# Patient Record
Sex: FEMALE | Race: WHITE | NOT HISPANIC OR LATINO | Employment: UNEMPLOYED | URBAN - METROPOLITAN AREA
[De-identification: names, ages, dates, MRNs, and addresses within clinical notes are randomized per-mention and may not be internally consistent; named-entity substitution may affect disease eponyms.]

---

## 2019-07-18 ENCOUNTER — AMBULATORY - HEALTHEAST (OUTPATIENT)
Dept: ADDICTION MEDICINE | Facility: CLINIC | Age: 44
End: 2019-07-18

## 2019-07-18 ENCOUNTER — OFFICE VISIT - HEALTHEAST (OUTPATIENT)
Dept: ADDICTION MEDICINE | Facility: CLINIC | Age: 44
End: 2019-07-18

## 2019-07-18 DIAGNOSIS — F15.20 METHAMPHETAMINE USE DISORDER, SEVERE (H): ICD-10-CM

## 2019-07-22 ENCOUNTER — OFFICE VISIT - HEALTHEAST (OUTPATIENT)
Dept: ADDICTION MEDICINE | Facility: CLINIC | Age: 44
End: 2019-07-22

## 2019-07-22 ENCOUNTER — AMBULATORY - HEALTHEAST (OUTPATIENT)
Dept: ADDICTION MEDICINE | Facility: CLINIC | Age: 44
End: 2019-07-22

## 2019-07-22 ENCOUNTER — DOCUMENTATION ONLY (OUTPATIENT)
Dept: OTHER | Facility: CLINIC | Age: 44
End: 2019-07-22

## 2019-07-22 ENCOUNTER — AMBULATORY - HEALTHEAST (OUTPATIENT)
Dept: OTHER | Facility: CLINIC | Age: 44
End: 2019-07-22

## 2019-07-22 DIAGNOSIS — F15.20 METHAMPHETAMINE USE DISORDER, SEVERE (H): ICD-10-CM

## 2019-07-24 ENCOUNTER — OFFICE VISIT - HEALTHEAST (OUTPATIENT)
Dept: ADDICTION MEDICINE | Facility: CLINIC | Age: 44
End: 2019-07-24

## 2019-07-24 DIAGNOSIS — F15.20 METHAMPHETAMINE USE DISORDER, SEVERE (H): ICD-10-CM

## 2019-07-26 ENCOUNTER — OFFICE VISIT - HEALTHEAST (OUTPATIENT)
Dept: ADDICTION MEDICINE | Facility: CLINIC | Age: 44
End: 2019-07-26

## 2019-07-26 DIAGNOSIS — F15.20 METHAMPHETAMINE USE DISORDER, SEVERE (H): ICD-10-CM

## 2019-07-29 ENCOUNTER — AMBULATORY - HEALTHEAST (OUTPATIENT)
Dept: ADDICTION MEDICINE | Facility: CLINIC | Age: 44
End: 2019-07-29

## 2019-07-31 ENCOUNTER — OFFICE VISIT - HEALTHEAST (OUTPATIENT)
Dept: ADDICTION MEDICINE | Facility: CLINIC | Age: 44
End: 2019-07-31

## 2019-07-31 DIAGNOSIS — F15.20 METHAMPHETAMINE USE DISORDER, SEVERE (H): ICD-10-CM

## 2019-08-02 ENCOUNTER — AMBULATORY - HEALTHEAST (OUTPATIENT)
Dept: ADDICTION MEDICINE | Facility: CLINIC | Age: 44
End: 2019-08-02

## 2019-08-02 ENCOUNTER — OFFICE VISIT - HEALTHEAST (OUTPATIENT)
Dept: ADDICTION MEDICINE | Facility: CLINIC | Age: 44
End: 2019-08-02

## 2019-08-02 DIAGNOSIS — F15.20 METHAMPHETAMINE USE DISORDER, SEVERE (H): ICD-10-CM

## 2019-08-05 ENCOUNTER — COMMUNICATION - HEALTHEAST (OUTPATIENT)
Dept: ADDICTION MEDICINE | Facility: CLINIC | Age: 44
End: 2019-08-05

## 2019-08-05 ENCOUNTER — OFFICE VISIT - HEALTHEAST (OUTPATIENT)
Dept: ADDICTION MEDICINE | Facility: CLINIC | Age: 44
End: 2019-08-05

## 2019-08-05 DIAGNOSIS — F15.20 METHAMPHETAMINE USE DISORDER, SEVERE (H): ICD-10-CM

## 2019-08-07 ENCOUNTER — OFFICE VISIT - HEALTHEAST (OUTPATIENT)
Dept: ADDICTION MEDICINE | Facility: CLINIC | Age: 44
End: 2019-08-07

## 2019-08-07 DIAGNOSIS — F15.20 METHAMPHETAMINE USE DISORDER, SEVERE (H): ICD-10-CM

## 2019-08-09 ENCOUNTER — OFFICE VISIT - HEALTHEAST (OUTPATIENT)
Dept: ADDICTION MEDICINE | Facility: CLINIC | Age: 44
End: 2019-08-09

## 2019-08-09 DIAGNOSIS — F15.20 METHAMPHETAMINE USE DISORDER, SEVERE (H): ICD-10-CM

## 2019-08-10 ENCOUNTER — AMBULATORY - HEALTHEAST (OUTPATIENT)
Dept: ADDICTION MEDICINE | Facility: CLINIC | Age: 44
End: 2019-08-10

## 2019-08-12 ENCOUNTER — OFFICE VISIT - HEALTHEAST (OUTPATIENT)
Dept: ADDICTION MEDICINE | Facility: CLINIC | Age: 44
End: 2019-08-12

## 2019-08-12 DIAGNOSIS — F15.20 METHAMPHETAMINE USE DISORDER, SEVERE (H): ICD-10-CM

## 2019-08-14 ENCOUNTER — OFFICE VISIT - HEALTHEAST (OUTPATIENT)
Dept: ADDICTION MEDICINE | Facility: CLINIC | Age: 44
End: 2019-08-14

## 2019-08-14 DIAGNOSIS — F15.20 METHAMPHETAMINE USE DISORDER, SEVERE (H): ICD-10-CM

## 2019-08-17 ENCOUNTER — AMBULATORY - HEALTHEAST (OUTPATIENT)
Dept: ADDICTION MEDICINE | Facility: CLINIC | Age: 44
End: 2019-08-17

## 2019-08-23 ENCOUNTER — AMBULATORY - HEALTHEAST (OUTPATIENT)
Dept: ADDICTION MEDICINE | Facility: CLINIC | Age: 44
End: 2019-08-23

## 2019-08-23 ENCOUNTER — OFFICE VISIT - HEALTHEAST (OUTPATIENT)
Dept: ADDICTION MEDICINE | Facility: CLINIC | Age: 44
End: 2019-08-23

## 2019-08-23 DIAGNOSIS — F15.20 METHAMPHETAMINE USE DISORDER, SEVERE (H): ICD-10-CM

## 2019-08-26 ENCOUNTER — OFFICE VISIT - HEALTHEAST (OUTPATIENT)
Dept: ADDICTION MEDICINE | Facility: CLINIC | Age: 44
End: 2019-08-26

## 2019-08-26 ENCOUNTER — AMBULATORY - HEALTHEAST (OUTPATIENT)
Dept: LAB | Facility: CLINIC | Age: 44
End: 2019-08-26

## 2019-08-26 DIAGNOSIS — F15.20 METHAMPHETAMINE USE DISORDER, SEVERE (H): ICD-10-CM

## 2019-08-26 LAB
AMPHETAMINES UR QL SCN: NORMAL
BARBITURATES UR QL: NORMAL
BENZODIAZ UR QL: NORMAL
CANNABINOIDS UR QL SCN: NORMAL
COCAINE UR QL: NORMAL
CREAT UR-MCNC: 12.1 MG/DL
ETHANOL UR CFM-MCNC: <10 MG/DL
METHADONE UR QL SCN: NORMAL
OPIATES UR QL SCN: NORMAL
OXYCODONE UR QL: NORMAL
PCP UR QL SCN: NORMAL

## 2019-08-28 ENCOUNTER — COMMUNICATION - HEALTHEAST (OUTPATIENT)
Dept: ADDICTION MEDICINE | Facility: CLINIC | Age: 44
End: 2019-08-28

## 2019-08-28 ENCOUNTER — OFFICE VISIT - HEALTHEAST (OUTPATIENT)
Dept: ADDICTION MEDICINE | Facility: CLINIC | Age: 44
End: 2019-08-28

## 2019-08-28 DIAGNOSIS — F15.20 METHAMPHETAMINE USE DISORDER, SEVERE (H): ICD-10-CM

## 2019-08-30 ENCOUNTER — AMBULATORY - HEALTHEAST (OUTPATIENT)
Dept: ADDICTION MEDICINE | Facility: CLINIC | Age: 44
End: 2019-08-30

## 2019-08-30 ENCOUNTER — OFFICE VISIT - HEALTHEAST (OUTPATIENT)
Dept: ADDICTION MEDICINE | Facility: CLINIC | Age: 44
End: 2019-08-30

## 2019-08-30 DIAGNOSIS — F15.20 METHAMPHETAMINE USE DISORDER, SEVERE (H): ICD-10-CM

## 2019-09-06 ENCOUNTER — AMBULATORY - HEALTHEAST (OUTPATIENT)
Dept: ADDICTION MEDICINE | Facility: CLINIC | Age: 44
End: 2019-09-06

## 2019-09-09 ENCOUNTER — OFFICE VISIT - HEALTHEAST (OUTPATIENT)
Dept: ADDICTION MEDICINE | Facility: CLINIC | Age: 44
End: 2019-09-09

## 2019-09-09 DIAGNOSIS — F15.20 METHAMPHETAMINE USE DISORDER, SEVERE (H): ICD-10-CM

## 2019-09-11 ENCOUNTER — OFFICE VISIT - HEALTHEAST (OUTPATIENT)
Dept: ADDICTION MEDICINE | Facility: CLINIC | Age: 44
End: 2019-09-11

## 2019-09-11 DIAGNOSIS — F15.20 METHAMPHETAMINE USE DISORDER, SEVERE (H): ICD-10-CM

## 2019-09-13 ENCOUNTER — OFFICE VISIT - HEALTHEAST (OUTPATIENT)
Dept: ADDICTION MEDICINE | Facility: CLINIC | Age: 44
End: 2019-09-13

## 2019-09-13 ENCOUNTER — AMBULATORY - HEALTHEAST (OUTPATIENT)
Dept: ADDICTION MEDICINE | Facility: CLINIC | Age: 44
End: 2019-09-13

## 2019-09-13 DIAGNOSIS — F15.20 METHAMPHETAMINE USE DISORDER, SEVERE (H): ICD-10-CM

## 2019-09-16 ENCOUNTER — AMBULATORY - HEALTHEAST (OUTPATIENT)
Dept: LAB | Facility: CLINIC | Age: 44
End: 2019-09-16

## 2019-09-16 ENCOUNTER — OFFICE VISIT - HEALTHEAST (OUTPATIENT)
Dept: ADDICTION MEDICINE | Facility: CLINIC | Age: 44
End: 2019-09-16

## 2019-09-16 DIAGNOSIS — F15.20 METHAMPHETAMINE USE DISORDER, SEVERE (H): ICD-10-CM

## 2019-09-16 LAB
AMPHETAMINES UR QL SCN: NORMAL
BARBITURATES UR QL: NORMAL
BENZODIAZ UR QL: NORMAL
CANNABINOIDS UR QL SCN: NORMAL
COCAINE UR QL: NORMAL
CREAT UR-MCNC: 88.3 MG/DL
ETHANOL UR CFM-MCNC: <10 MG/DL
METHADONE UR QL SCN: NORMAL
OPIATES UR QL SCN: NORMAL
OXYCODONE UR QL: NORMAL
PCP UR QL SCN: NORMAL

## 2019-09-18 ENCOUNTER — OFFICE VISIT - HEALTHEAST (OUTPATIENT)
Dept: ADDICTION MEDICINE | Facility: CLINIC | Age: 44
End: 2019-09-18

## 2019-09-18 DIAGNOSIS — F15.20 METHAMPHETAMINE USE DISORDER, SEVERE (H): ICD-10-CM

## 2019-09-20 ENCOUNTER — AMBULATORY - HEALTHEAST (OUTPATIENT)
Dept: ADDICTION MEDICINE | Facility: CLINIC | Age: 44
End: 2019-09-20

## 2019-09-20 ENCOUNTER — OFFICE VISIT - HEALTHEAST (OUTPATIENT)
Dept: ADDICTION MEDICINE | Facility: CLINIC | Age: 44
End: 2019-09-20

## 2019-09-20 DIAGNOSIS — F15.20 METHAMPHETAMINE USE DISORDER, SEVERE (H): ICD-10-CM

## 2019-09-26 ENCOUNTER — AMBULATORY - HEALTHEAST (OUTPATIENT)
Dept: ADDICTION MEDICINE | Facility: CLINIC | Age: 44
End: 2019-09-26

## 2019-09-30 ENCOUNTER — OFFICE VISIT - HEALTHEAST (OUTPATIENT)
Dept: ADDICTION MEDICINE | Facility: CLINIC | Age: 44
End: 2019-09-30

## 2019-09-30 DIAGNOSIS — F15.20 METHAMPHETAMINE USE DISORDER, SEVERE (H): ICD-10-CM

## 2019-10-02 ENCOUNTER — OFFICE VISIT - HEALTHEAST (OUTPATIENT)
Dept: ADDICTION MEDICINE | Facility: CLINIC | Age: 44
End: 2019-10-02

## 2019-10-02 DIAGNOSIS — F15.20 METHAMPHETAMINE USE DISORDER, SEVERE (H): ICD-10-CM

## 2019-10-04 ENCOUNTER — OFFICE VISIT - HEALTHEAST (OUTPATIENT)
Dept: ADDICTION MEDICINE | Facility: CLINIC | Age: 44
End: 2019-10-04

## 2019-10-04 ENCOUNTER — AMBULATORY - HEALTHEAST (OUTPATIENT)
Dept: ADDICTION MEDICINE | Facility: CLINIC | Age: 44
End: 2019-10-04

## 2019-10-04 DIAGNOSIS — F15.20 METHAMPHETAMINE USE DISORDER, SEVERE (H): ICD-10-CM

## 2019-10-07 ENCOUNTER — OFFICE VISIT - HEALTHEAST (OUTPATIENT)
Dept: ADDICTION MEDICINE | Facility: CLINIC | Age: 44
End: 2019-10-07

## 2019-10-07 DIAGNOSIS — F15.20 METHAMPHETAMINE USE DISORDER, SEVERE (H): ICD-10-CM

## 2019-10-10 ENCOUNTER — AMBULATORY - HEALTHEAST (OUTPATIENT)
Dept: ADDICTION MEDICINE | Facility: CLINIC | Age: 44
End: 2019-10-10

## 2019-10-11 ENCOUNTER — OFFICE VISIT - HEALTHEAST (OUTPATIENT)
Dept: ADDICTION MEDICINE | Facility: CLINIC | Age: 44
End: 2019-10-11

## 2019-10-11 DIAGNOSIS — F15.20 METHAMPHETAMINE USE DISORDER, SEVERE (H): ICD-10-CM

## 2019-10-14 ENCOUNTER — OFFICE VISIT - HEALTHEAST (OUTPATIENT)
Dept: ADDICTION MEDICINE | Facility: CLINIC | Age: 44
End: 2019-10-14

## 2019-10-14 ENCOUNTER — AMBULATORY - HEALTHEAST (OUTPATIENT)
Dept: ADDICTION MEDICINE | Facility: CLINIC | Age: 44
End: 2019-10-14

## 2019-10-14 DIAGNOSIS — F15.20 METHAMPHETAMINE USE DISORDER, SEVERE (H): ICD-10-CM

## 2019-10-18 ENCOUNTER — AMBULATORY - HEALTHEAST (OUTPATIENT)
Dept: ADDICTION MEDICINE | Facility: CLINIC | Age: 44
End: 2019-10-18

## 2019-10-18 ENCOUNTER — OFFICE VISIT - HEALTHEAST (OUTPATIENT)
Dept: ADDICTION MEDICINE | Facility: CLINIC | Age: 44
End: 2019-10-18

## 2019-10-18 DIAGNOSIS — F15.20 METHAMPHETAMINE USE DISORDER, SEVERE (H): ICD-10-CM

## 2019-10-21 ENCOUNTER — OFFICE VISIT - HEALTHEAST (OUTPATIENT)
Dept: ADDICTION MEDICINE | Facility: CLINIC | Age: 44
End: 2019-10-21

## 2019-10-21 DIAGNOSIS — F15.20 METHAMPHETAMINE USE DISORDER, SEVERE (H): ICD-10-CM

## 2019-10-25 ENCOUNTER — AMBULATORY - HEALTHEAST (OUTPATIENT)
Dept: ADDICTION MEDICINE | Facility: CLINIC | Age: 44
End: 2019-10-25

## 2019-10-28 ENCOUNTER — OFFICE VISIT - HEALTHEAST (OUTPATIENT)
Dept: ADDICTION MEDICINE | Facility: CLINIC | Age: 44
End: 2019-10-28

## 2019-10-28 ENCOUNTER — AMBULATORY - HEALTHEAST (OUTPATIENT)
Dept: LAB | Facility: CLINIC | Age: 44
End: 2019-10-28

## 2019-10-28 DIAGNOSIS — F15.20 METHAMPHETAMINE USE DISORDER, SEVERE (H): ICD-10-CM

## 2019-10-28 LAB
AMPHETAMINES UR QL SCN: NORMAL
BARBITURATES UR QL: NORMAL
BENZODIAZ UR QL: NORMAL
CANNABINOIDS UR QL SCN: NORMAL
COCAINE UR QL: NORMAL
CREAT UR-MCNC: 39.1 MG/DL
ETHANOL UR CFM-MCNC: <10 MG/DL
METHADONE UR QL SCN: NORMAL
OPIATES UR QL SCN: NORMAL
OXYCODONE UR QL: NORMAL
PCP UR QL SCN: NORMAL

## 2019-10-31 ENCOUNTER — COMMUNICATION - HEALTHEAST (OUTPATIENT)
Dept: ADDICTION MEDICINE | Facility: CLINIC | Age: 44
End: 2019-10-31

## 2019-11-01 ENCOUNTER — AMBULATORY - HEALTHEAST (OUTPATIENT)
Dept: ADDICTION MEDICINE | Facility: CLINIC | Age: 44
End: 2019-11-01

## 2019-11-01 ENCOUNTER — OFFICE VISIT - HEALTHEAST (OUTPATIENT)
Dept: ADDICTION MEDICINE | Facility: CLINIC | Age: 44
End: 2019-11-01

## 2019-11-01 DIAGNOSIS — F15.20 METHAMPHETAMINE USE DISORDER, SEVERE (H): ICD-10-CM

## 2019-11-08 ENCOUNTER — AMBULATORY - HEALTHEAST (OUTPATIENT)
Dept: ADDICTION MEDICINE | Facility: CLINIC | Age: 44
End: 2019-11-08

## 2019-11-08 ENCOUNTER — OFFICE VISIT - HEALTHEAST (OUTPATIENT)
Dept: ADDICTION MEDICINE | Facility: CLINIC | Age: 44
End: 2019-11-08

## 2019-11-08 DIAGNOSIS — F15.20 METHAMPHETAMINE USE DISORDER, SEVERE (H): ICD-10-CM

## 2019-11-11 ENCOUNTER — OFFICE VISIT - HEALTHEAST (OUTPATIENT)
Dept: ADDICTION MEDICINE | Facility: CLINIC | Age: 44
End: 2019-11-11

## 2019-11-11 DIAGNOSIS — F15.20 METHAMPHETAMINE USE DISORDER, SEVERE (H): ICD-10-CM

## 2019-11-14 ENCOUNTER — COMMUNICATION - HEALTHEAST (OUTPATIENT)
Dept: ADDICTION MEDICINE | Facility: CLINIC | Age: 44
End: 2019-11-14

## 2019-11-14 ENCOUNTER — AMBULATORY - HEALTHEAST (OUTPATIENT)
Dept: ADDICTION MEDICINE | Facility: CLINIC | Age: 44
End: 2019-11-14

## 2019-11-18 ENCOUNTER — OFFICE VISIT - HEALTHEAST (OUTPATIENT)
Dept: ADDICTION MEDICINE | Facility: CLINIC | Age: 44
End: 2019-11-18

## 2019-11-18 DIAGNOSIS — F15.20 METHAMPHETAMINE USE DISORDER, SEVERE (H): ICD-10-CM

## 2019-11-22 ENCOUNTER — AMBULATORY - HEALTHEAST (OUTPATIENT)
Dept: ADDICTION MEDICINE | Facility: CLINIC | Age: 44
End: 2019-11-22

## 2019-11-27 ENCOUNTER — AMBULATORY - HEALTHEAST (OUTPATIENT)
Dept: ADDICTION MEDICINE | Facility: CLINIC | Age: 44
End: 2019-11-27

## 2019-12-06 ENCOUNTER — AMBULATORY - HEALTHEAST (OUTPATIENT)
Dept: ADDICTION MEDICINE | Facility: CLINIC | Age: 44
End: 2019-12-06

## 2019-12-06 ENCOUNTER — OFFICE VISIT - HEALTHEAST (OUTPATIENT)
Dept: ADDICTION MEDICINE | Facility: CLINIC | Age: 44
End: 2019-12-06

## 2019-12-06 DIAGNOSIS — F15.20 METHAMPHETAMINE USE DISORDER, SEVERE (H): ICD-10-CM

## 2019-12-09 ENCOUNTER — AMBULATORY - HEALTHEAST (OUTPATIENT)
Dept: ADDICTION MEDICINE | Facility: CLINIC | Age: 44
End: 2019-12-09

## 2019-12-14 ENCOUNTER — AMBULATORY - HEALTHEAST (OUTPATIENT)
Dept: ADDICTION MEDICINE | Facility: CLINIC | Age: 44
End: 2019-12-14

## 2019-12-19 ENCOUNTER — AMBULATORY - HEALTHEAST (OUTPATIENT)
Dept: ADDICTION MEDICINE | Facility: CLINIC | Age: 44
End: 2019-12-19

## 2019-12-24 ENCOUNTER — AMBULATORY - HEALTHEAST (OUTPATIENT)
Dept: ADDICTION MEDICINE | Facility: CLINIC | Age: 44
End: 2019-12-24

## 2019-12-31 ENCOUNTER — AMBULATORY - HEALTHEAST (OUTPATIENT)
Dept: ADDICTION MEDICINE | Facility: CLINIC | Age: 44
End: 2019-12-31

## 2019-12-31 ENCOUNTER — COMMUNICATION - HEALTHEAST (OUTPATIENT)
Dept: ADDICTION MEDICINE | Facility: CLINIC | Age: 44
End: 2019-12-31

## 2020-01-09 ENCOUNTER — AMBULATORY - HEALTHEAST (OUTPATIENT)
Dept: ADDICTION MEDICINE | Facility: CLINIC | Age: 45
End: 2020-01-09

## 2020-01-09 ENCOUNTER — COMMUNICATION - HEALTHEAST (OUTPATIENT)
Dept: ADDICTION MEDICINE | Facility: CLINIC | Age: 45
End: 2020-01-09

## 2021-05-30 NOTE — PROGRESS NOTES
Agnes Montano attended 3 hours of group today.     The group topic was Grief and Loss, patient was responsive to topic.     Patients engagement in the group session: high     Total group size: 7      ALLEN Morton  7/24/2019, 3:04 PM

## 2021-05-30 NOTE — PROGRESS NOTES
LATE ENTRY FOR 7/26/19    D. Counselor met with patient for 40 minutes to discuss treatment progress. A. Counselor inquired about what the patient wanted to accomplish while in outpatient treatment. Counselor discussed the importance of boundaries related to time, and maintaining boundaries with children. Counselor inquired about patient's plan for recovery group involvement, and encourage patient to not put too many things on her plate and to remember to delegate. R. Patient discussed that she wants to find better ways to manage stress, and that she had a tendency to do everything for her family. Patient reported that she working on boundaries with her children, particularly with her teenage daughter, identified that guilt is often a reason for not maintaining boundaries, was receptive to feedback. Patient reported plan to work with care coordinator from her insurance for psychiatry, therapy and pain management referrals. Patient discussed that she was very involved in [facility] in the past, but due to some relational conflict in the community, she will pursue making connects in other groups after her housing gets settled. T. Patient to continue with phase I.     Patient treatment was reviewed. Changes were not made. Patient was actively and directly involved in the treatment plan review and updates.     SOL Morton, [facility]  7/29/2019, 3:13 PM

## 2021-05-30 NOTE — PROGRESS NOTES
"Addiction Services - Initial Services Plan     Patient  Name: Agnes Montano  MRN: [id number]   : 1975  Admit Date: 19       Patient describes their immediate need: To learn recovery skills to prevent relapse. Managing impulsive feelings and emotions.    Are there any immediate Safety Needs such as (physical, stability, mobility):  Pt is able to get medical care as needed. Pt denies immediate concerns.     Immediate Health Needs and Plan:   None    Vulnerable Adult: No     Issues to be addressed in the first sessions:   Orientation to program.    Patient strengths and needs:   Strengths identified as \"I'm a go-getter. I work independently very well. I get things done. I'm a multitasker. I'm a strong individual.\"  Needs identified as \"I think I could learn to manage my impulsive feelings on things. I would like to exercise.\"    Plan for patient for time between intake and completion of the treatment plan:   Attend all group therapy sessions as directed, complete all written and oral assignments as directed, and remain clean and sober. A relapse, if any, must be reported to staff immediately in order to ensure you are receiving the proper level of care. If you cannot attend a group therapy session you must call contact information provided in intake folder and leave a message before or during group hours.       Vulnerable Adult Review   [X] Review of the Facility Abuse Prevention plan was reviewed with the patient   [X] No Individual Abuse Plan is necessary   [ ] In addition to the Facility Abuse Prevention plan, an Individual Abuse Plan will be put in place       Staff Name/Title: Milagros Gallego   Date: 2019  Time: 11:11 AM        "

## 2021-05-30 NOTE — PROGRESS NOTES
Patient completed Diagnostic Assessment for Mental Health on 7/3/19 with BORA Burch, [facility]. Diagnoses at that time were:   Major depressive disorder, recurrent episode, moderate  Generalized anxiety disorder  Posttraumatic stress disorder  Stimulant use disorder, severe, amphetamine type     Assessment was reviewed by [facility] staff.     SOL Morton, [facility]EN  7/22/2019, 8:28 AM

## 2021-05-30 NOTE — PROGRESS NOTES
Agnes Montano attended 3 hours of group today. Today was patient's first day of group. Patient introduced herself and was welcomed by group.     The group topic was Grief and Loss, patient was responsive to topic.     Patients engagement in the group session: high     Total group size: 8      ALLEN Morton  7/22/2019, 12:10 PM

## 2021-05-30 NOTE — PROGRESS NOTES
Agnes Montano attended 3 hours of group today.     The group topic was Grief and Loss, patient was responsive to topic.     Patients engagement in the group session: high     Total group size: 8      ALLEN Morton  7/26/2019, 2:14 PM
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
## 2021-05-31 NOTE — PROGRESS NOTES
Agnes Montano attended 3 hours of group today.     The group topic was Dual Diagnosis I, patient was responsive to topic.     Patients engagement in the group session: high     Total group size: 9      ALLEN Morton  8/26/2019, 3:42 PM

## 2021-05-31 NOTE — PROGRESS NOTES
Agnes Montano attended 1 hours of group today. Patient reported meeting with her HR dept [place], requested a treatment verification letter, which was given to the patient on this date.     The group topic was Strengths, patient was responsive to topic.     Patients engagement in the group session: high     Total group size: 8      ALLEN Morton  7/31/2019, 12:11 PM
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
## 2021-05-31 NOTE — PROGRESS NOTES
"LATE ENTRY FOR 8/30/19    D. Counselor met with patient for 45 minutes to discuss treatment progress. A. Counselor inquired about how settling into the new apartment is going for the patient. Counselor inquired about patient's anxiety, and discussed potential triggers to anxiety and ways to use skills to cope. Counselor encouraged patient to continue to work on maintaining boundaries, particularly with her father, to support her recovery. Counselor also encouraged patient to find time for herself, having boundaries around time. Counselor discussed that reading \"Co-dependent no more\" may be helpful for the patient. R. Patient reported that things are getting more settled into the apartment. Patient discussed that she is continuing to have high anxiety, particularly when other's are concerned about her. Patient discussed that she is considering going to a meditation group and is planning on starting therapy soon. Patient reports dealing with guilt related to her use. Patient also discussed history of having poor boundaries with her father, which most recently led to relapse, but reported that he is working on maintaining boundaries. Patient reported interest in book. T. Patient continue with phase I.    Patient treatment was reviewed. Changes were made. Patient was actively and directly involved in the treatment plan review and updates.     SOL Morton, [facility]  9/3/2019, 9:46 AM  "

## 2021-05-31 NOTE — PROGRESS NOTES
Agnes Montano attended 3 hours of group today.     The group topic was Dual Diagnosis I, patient was responsive to topic.     Patients engagement in the group session: medium     Total group size: 7      ALLEN Morton  8/28/2019, 1:37 PM

## 2021-05-31 NOTE — PROGRESS NOTES
Agnes Montano attended 3 hours of group today.     The group topic was Strengths, patient was responsive to topic. Patient signed additional releases for her work and housing resources on this date.      Patients engagement in the group session: high     Total group size: 9      ALLEN Morton  8/2/2019, 2:24 PM

## 2021-05-31 NOTE — PROGRESS NOTES
Agnes Montano attended 2 hours of group today.     The group topic was Stress Management, patient was responsive to topic.     Patients engagement in the group session: medium     Total group size: 12      ALLEN Jefferson  8/12/2019, 1:49 PM

## 2021-05-31 NOTE — PROGRESS NOTES
Agnes Montano attended 1 hours of group today.     The group topic was Stress Management, patient was responsive to topic.     Patients engagement in the group session: medium     Total group size: 11      ALLEN Jefferson  8/14/2019, 11:13 AM

## 2021-05-31 NOTE — PROGRESS NOTES
Agnes Montano attended 3 hours of group today.     The group topic was Stress Management, patient was responsive to topic.     Patients engagement in the group session: high     Total group size: 7      ALLEN Morton  8/23/2019, 12:12 PM
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
## 2021-05-31 NOTE — PROGRESS NOTES
Agnse Monatno attended 3 hours of group today.     The group topic was Wellness, patient was responsive to topic.     Patients engagement in the group session: high     Total group size: 10      ALLEN Morton  8/5/2019, 2:51 PM

## 2021-05-31 NOTE — PROGRESS NOTES
Agnes Montano attended 3 hours of group today.     The group topic was Wellness, patient was responsive to topic.     Patients engagement in the group session: high     Total group size: 10      ALLEN Jefferson  8/9/2019, 12:06 PM

## 2021-05-31 NOTE — PROGRESS NOTES
Agnes Montano attended 3 hours of group today.     The group topic was Wellness, patient was responsive to topic.     Patients engagement in the group session: high     Total group size: 11      ALLEN Jefferson  8/7/2019, 1:54 PM

## 2021-05-31 NOTE — PROGRESS NOTES
Agnes Montano attended 3 hours of group today.     The group topic was Dual Diagnosis I, patient was responsive to topic.     Patients engagement in the group session: high     Total group size: 7      ALLEN Jefferson  8/30/2019, 12:35 PM

## 2021-06-01 NOTE — PROGRESS NOTES
Weekly Progress Note  Agnes Montano  1975  [id number]      D) Pt attended ZERO groups  this week with 1 excused and 1 unexcused absences. A) Staff facilitated groups and reviewed tx progress. Assessed for [facility]. R) Unable to assess along six dimensions or for [facility] due to lack of attendance.   T)  Patient has completed 38 of 90 program hours at this time, patient is in phase I. Projected discharge date is 10/16/19. Current discharge plan is phase III.     Dawn Mcgraw [facility], [facility]  9/6/2019, 4:17 PM       Weekly Educational Topics Date   1. Dual Diagnoses, week 1 8/26, 8/28, 8/30   2. Dual Diagnoses, week 2    3. Stress Management 8/12, 8/14 8/23   4. Feelings/Emotions    5. Thinking    6. Change    7. Recovery Support    8. Relationships/Communication    9. Addiction 101    10. Relapse Prevention    11. Grief and Loss 7/22, 7/24, 7/26   12. Strengths 7/31, 8/2   13. Wellness 8/5, 8/7, 8/9
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
## 2021-06-01 NOTE — PROGRESS NOTES
Unable to assess Pt due to no attendance this week. Pt. did not attend group this week due to: work conflict on 9/25. Pt Plans to return to group on: 9/30/19.

## 2021-06-01 NOTE — PROGRESS NOTES
Agnes Montano attended 3 hours of group today.     The group topic was Feelings/Emotions, patient was responsive to topic.     Patients engagement in the group session: medium     Total group size: 9      ALLEN Morton  9/11/2019, 1:53 PM

## 2021-06-01 NOTE — PROGRESS NOTES
Agnes Montano attended 3 hours of group today.     The group topic was Feelings/Emotions, patient was responsive to topic.     Patients engagement in the group session: high     Total group size: 9      Bailey Conner  9/13/2019, 2:11 PM

## 2021-06-01 NOTE — PROGRESS NOTES
Agnes Montano attended 3 hours of group today.     The group topic was Thinking, patient was responsive to topic.     Patients engagement in the group session: high     Total group size: 7      ALLEN Morton  9/20/2019, 12:13 PM
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
## 2021-06-01 NOTE — PROGRESS NOTES
Agnes Montano attended 3 hours of group today.     The group topic was Thinking, patient was responsive to topic.     Patients engagement in the group session: medium     Total group size: 10      ALLEN Morton  9/16/2019, 1:54 PM

## 2021-06-01 NOTE — PROGRESS NOTES
Agnes Montano attended 3 hours of group today.     The group topic was Feelings/Emotions, patient was responsive to topic.     Patients engagement in the group session: high     Total group size: 6      Bailey Conner  9/9/2019, 2:23 PM
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
## 2021-06-01 NOTE — PROGRESS NOTES
Agnes Montano attended 3 hours of group today.     The group topic was Addiction 101, patient was responsive to topic.     Patients engagement in the group session: high     Total group size: 8      ALLEN Morton  9/30/2019, 12:22 PM

## 2021-06-01 NOTE — PROGRESS NOTES
Agnes Montano attended 2 hours of group today.     The group topic was Thinking, patient was responsive to topic.     Patients engagement in the group session: high     Total group size: 8      ALLEN Morton  9/18/2019, 1:56 PM
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
## 2021-06-02 NOTE — PROGRESS NOTES
Agnes Montano attended 3 hours of group today.     The group topic was Grief and Loss, patient was responsive to topic.     Patient's engagement in the group session: high     Total group size: 9      Molly De Jesus  10/28/2019, 2:42 PM

## 2021-06-02 NOTE — PROGRESS NOTES
Agnes Montano attended 3 hours of group today.     The group topic was Relapse Prevention, patient was responsive to topic.     Patient's engagement in the group session: high     Total group size: 10      Bailey Conner  10/11/2019, 1:56 PM

## 2021-06-02 NOTE — PROGRESS NOTES
Agnes Montano attended 3 hours of group today.     The group topic was Addiction 101, patient was responsive to topic.     Patients engagement in the group session: high     Total group size: 6      ALLEN Morton  10/4/2019, 4:27 PM

## 2021-06-02 NOTE — PROGRESS NOTES
Agnes Montano attended 3 hours of group today.     The group topic was Relationships/Boundaries, patient was responsive to topic.     Patient's engagement in the group session: medium     Total group size: 11      ALLEN Morton  10/21/2019, 1:44 PM

## 2021-06-02 NOTE — PROGRESS NOTES
Agnes Montano attended 3 hours of group today. Patient signed NANI for probation on this date.     The group topic was Recovery Support, patient was responsive to topic.     Patient's engagement in the group session: medium     Total group size: 10      ALLEN Morton  10/18/2019, 12:21 PM

## 2021-06-02 NOTE — PROGRESS NOTES
Agnes Montano attended 2 hours of group today. Patient signed NANI for  on this date.     The group topic was Recovery Support, patient was responsive to topic.     Patient's engagement in the group session: high     Total group size: 12      ALLEN Morton  10/14/2019, 12:17 PM

## 2021-06-02 NOTE — PROGRESS NOTES
Agnes Montano attended 1 hours of group today.     The group topic was Relapse Prevention, patient was responsive to topic.     Patients engagement in the group session: medium     Total group size: 9      ALLEN Morton  10/7/2019, 1:39 PM
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
## 2021-06-02 NOTE — PROGRESS NOTES
LATE ENTRY FOR 10/2/19    Agnes Montano attended 2 hours of group therapy on 10/2/19.     The group topic was Addiction 101, patient was responsive to topic.     Patient's engagement in the group session: medium     Total group size: 9      ALLEN Morton  11/1/2019 , 1:57 PM
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
## 2021-06-02 NOTE — PROGRESS NOTES
FADY. Counselor met with patient for 35 minutes to discuss treatment progress. A. Counselor inquired about the patient's stress level and encouraged patient to be aware of when she is taking too much responsibility for various situations. Counselor asked patient about boundaries with her family and if she is finding ways to do self care. Counselor discussed that patient is stepping down to phase II and will be attending two days per week. R. Patient reported her biggest concern right now is her legal concerns and this may impact her job. Patient reported that she is attempting to be in the moment. Patient reported worrying about her children, with frequent checking, but is also attempting to set boundaries. Patient reported that she has been maintaining boundaries with her father. Patient reported that she still has not started therapy but is planning on asking her psychiatrist for a referral. Patient reported preference to attend Mondays and Fridays. T. Patient to start phase II starting 10/7/19.     Patient treatment was reviewed. Changes were made. Patient was actively and directly involved in the treatment plan review and updates.     SOL Morton, [facility]  10/4/2019, 4:33 PM
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
## 2021-06-02 NOTE — PROGRESS NOTES
Agnes Montano attended 2 hours of group today. Patient reported needing to leave early for ENT appointment.     The group topic was Grief and Loss, patient was responsive to topic.     Patient's engagement in the group session: medium     Total group size: 9      ALLEN Morton  11/1/2019, 2:15 PM
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
## 2021-06-03 NOTE — PROGRESS NOTES
Agnes Montano attended 3 hours of group today.     The group topic was Wellness, patient was responsive to topic.     Patient's engagement in the group session: medium     Total group size: 9      Baiely Conner  11/18/2019, 3:29 PM
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
## 2021-06-03 NOTE — PROGRESS NOTES
Agnes Montano attended 3 hours of group today.     The group topic was Stress Management, patient was responsive to topic.     Patient's engagement in the group session: medium     Total group size: 8      ALLEN Morton  11/11/2019, 12:19 PM

## 2021-06-03 NOTE — PROGRESS NOTES
FADY. Counselor met with patient for 45 minutes to discuss treatment progress. A. Counselor inquired into what the patient feels that she needs to work on in treatment, discussed plan to check into therapist availability in [facility] clinic. Counselor encouraged patient to make sure that she is creating margins in her life, and that she is expressing self compassion in her life. Counselor discussed plan for patient to transfer to phase III group after establishing care with a therapist. R. Patient reported that she hasn't been able to find a therapist on her own, asked if she would be able to see a therapist at [facility]. Patient reported that she knows that burnout is risky to her recovery and is attempting to set boundaries and ask for help. Patient in agreement with plan to transfer to phase III after establishing care with a therapist. T. Patient to continue with phase II, counselor to see about therapy referrals.     Patient treatment was reviewed. Changes were not made. Patient was actively and directly involved in the treatment plan review and updates.     SOL Morton, [facility]  11/8/2019, 5:09 PM

## 2021-06-03 NOTE — PROGRESS NOTES
Agnes Montano attended 3 hours of group today.     The group topic was Strengths, patient was responsive to topic.     Patient's engagement in the group session: medium     Total group size: 8      ALLEN Morton  11/8/2019, 2:32 PM
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
## 2021-06-03 NOTE — PROGRESS NOTES
Weekly Progress Note  Agnes Montano  1975  [id number]      D) Pt attended ZERO groups  this week with excused absences, patient reported being ill on 11/25/19 and does not attend Wednesday group, group cancelled on 11/29/19. A) Staff facilitated groups and reviewed tx progress. Assessed for [facility]. R) Unable to assess along six dimensions or for VA due to lack of attendance.   T) Patient has completed 93 of 90 program hours at this time, patient is in phase II. Projected completion for phase II is 11/29/19, extending until establishing care with an individual therapist. . Current discharge plan is phase III.     Dawn Mcgraw, MPS, [facility]  11/27/2019, 12:09 PM       Weekly Educational Topics Date   1. Dual Diagnoses, week 1 8/26, 8/28, 8/30   2. Dual Diagnoses, week 2    3. Stress Management 8/12, 8/14 8/23; 11/11   4. Feelings/Emotions 9/9, 9/11, 9/13   5. Thinking 9/16, 9/18, 9/20   6. Change    7. Recovery Support 10/14, 10/18   8. Relationships/Communication 10/21   9. Addiction 101 9/30, 10/2, 10/4   10. Relapse Prevention 10/7, 10/11   11. Grief and Loss 7/22, 7/24, 7/26;   10/28, 10/30, 11/1   12. Strengths 7/31, 8/2; 11/8   13. Wellness 8/5, 8/7, 8/9; 11/18

## 2021-06-04 NOTE — TELEPHONE ENCOUNTER
Counselor returned patient's phone call. Counselor explained that due to patient's lack of attendance a referral to another program would not be possible at this time. Counselor discussed willigness to meet with patient to assess and make referral to an aftercare program. Patient reported willingness to meet with counselor, agreed to meet with counselor on 1/7/20 at 1:30pm.     SOL Morton, [facility]  12/31/2019, 11:23 AM

## 2021-06-04 NOTE — PROGRESS NOTES
Agnes Montano attended 3 hours of group today. Patient completed phase II as of this date and will start phase III on 12/17/19.     The group topic was Dual Diagnosis II, patient was responsive to topic.     Patient's engagement in the group session: medium     Total group size: 8      ALLEN Morton  12/6/2019, 2:18 PM

## 2021-06-04 NOTE — PROGRESS NOTES
Weekly Progress Note 12/23/19--12/27/19  Agnes Montano  1975  [id number]      D) Pt attended 0/1 groups this week with 1 excused absence. A) Staff facilitated groups and reviewed tx progress. Assessed for [facility]. R) Unable to assess along six dimensions or for [facility] due to lack of attendance.   T) Patient has completed 96 of 120 (total hours) program hours at this time, patient completed phase II as of 12/6/19 and planned to start phase III on 12/10/19. Current discharge plan is individual therapy and community supports. Patient did not attend group on 12/24/19, and contacted staff on 12/23/19 to inform of inability to attend group on 12/24/19.     Molly Em  12/26/2019, 9:17 AM     Weekly Educational Topics Date   1. Dual Diagnoses, week 1 8/26, 8/28, 8/30   2. Dual Diagnoses, week 2 12/6   3. Stress Management 8/12, 8/14 8/23; 11/11   4. Feelings/Emotions 9/9, 9/11, 9/13   5. Thinking 9/16, 9/18, 9/20   6. Change    7. Recovery Support 10/14, 10/18   8. Relationships/Communication 10/21   9. Addiction 101 9/30, 10/2, 10/4   10. Relapse Prevention 10/7, 10/11   11. Grief and Loss 7/22, 7/24, 7/26;   10/28, 10/30, 11/1   12. Strengths 7/31, 8/2; 11/8   13. Wellness 8/5, 8/7, 8/9; 11/18
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
## 2021-06-16 PROBLEM — F15.20 METHAMPHETAMINE USE DISORDER, SEVERE (H): Status: ACTIVE | Noted: 2019-07-03
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
## 2021-06-28 NOTE — PROGRESS NOTES
Progress Notes by Dawn Mcgraw LADC at 12/14/2019  5:40 PM     Author: Dawn Mcgraw LADC Service: -- Author Type: Licensed Alcohol and Drug Counselor    Filed: 12/14/2019  5:41 PM Encounter Date: 12/14/2019 Status: Attested    : Dawn Mcgraw LADC (Licensed Alcohol and Drug Counselor) Cosigner: Beth Milan Psy.D, [place] at 12/22/2019  9:37 AM    Attestation signed by Beth Milan Psy.D, LP at 12/22/2019  9:37 AM    I have read, discussed and agree with the documentation provided by SOL Morton LADC.  Beth Milan PsyD, ABPP, LP                  Weekly Progress Note 12/9/19--12/13/19  MontanoAgnes ho  1975  [id number]      D) Pt attended ZERO groups  this week with 1 unexcused absence. A) Staff facilitated groups and reviewed tx progress. Assessed for [facility]. R) Unable to assess along six dimensions or for [facility] due to lack of attendance.   T) Patient has completed 96 of 120 (total hours) program hours at this time, aidee completed phase II as of 12/6/19 and will be starting phase III on 12/17/19. Current discharge plan is individual therapy and community supports.      SOL Morton LADC  12/14/2019, 5:41 PM       Weekly Educational Topics Date   1. Dual Diagnoses, week 1 8/26, 8/28, 8/30   2. Dual Diagnoses, week 2 12/6   3. Stress Management 8/12, 8/14 8/23; 11/11   4. Feelings/Emotions 9/9, 9/11, 9/13   5. Thinking 9/16, 9/18, 9/20   6. Change    7. Recovery Support 10/14, 10/18   8. Relationships/Communication 10/21   9. Addiction 101 9/30, 10/2, 10/4   10. Relapse Prevention 10/7, 10/11   11. Grief and Loss 7/22, 7/24, 7/26;   10/28, 10/30, 11/1   12. Strengths 7/31, 8/2; 11/8   13. Wellness 8/5, 8/7, 8/9; 11/18

## 2021-06-28 NOTE — PROGRESS NOTES
Progress Notes by Dawn Mcgraw LADC at 12/19/2019  8:49 AM     Author: Dawn Mcgraw LADC Service: -- Author Type: Licensed Alcohol and Drug Counselor    Filed: 12/19/2019  8:56 AM Encounter Date: 12/19/2019 Status: Attested    : Dawn Mcgraw LADC (Licensed Alcohol and Drug Counselor) Cosigner: Beth Milan Psy.D, [place]T at 12/22/2019  9:30 AM    Attestation signed by Beth Milan Psy.D, LP at 12/22/2019  9:30 AM    I have read, discussed and agree with the documentation provided by SOL Morton LADC.  Beth Milan PsyD, ABPP, LP                  Weekly Progress Note 12/16/19--12/20/19  MontanoWilliam hoher  1975  [id number]      D) Pt attended ZERO groups  this week with 1 unexcused absence. A) Staff facilitated groups and reviewed tx progress. Assessed for [facility]. R) Unable to assess along six dimensions or for VA due to lack of attendance.   T) Patient has completed 96 of 120 (total hours) program hours at this time, patient completed phase II as of 12/6/19 and planned to start phase III on 12/10/19. Current discharge plan is individual therapy and community supports. If patient does not present to group on 12/24/19, patient will be discharged AWOL.     SOL Morton LADC  12/19/2019, 8:56 AM       Weekly Educational Topics Date   1. Dual Diagnoses, week 1 8/26, 8/28, 8/30   2. Dual Diagnoses, week 2 12/6   3. Stress Management 8/12, 8/14 8/23; 11/11   4. Feelings/Emotions 9/9, 9/11, 9/13   5. Thinking 9/16, 9/18, 9/20   6. Change    7. Recovery Support 10/14, 10/18   8. Relationships/Communication 10/21   9. Addiction 101 9/30, 10/2, 10/4   10. Relapse Prevention 10/7, 10/11   11. Grief and Loss 7/22, 7/24, 7/26;   10/28, 10/30, 11/1   12. Strengths 7/31, 8/2; 11/8   13. Wellness 8/5, 8/7, 8/9; 11/18

## 2021-06-28 NOTE — PROGRESS NOTES
Progress Notes by Dawn Mcgraw LADC at 12/31/2019 11:57 AM     Author: Dawn Mcgraw LADC Service: -- Author Type: Licensed Alcohol and Drug Counselor    Filed: 12/31/2019 12:02 PM Encounter Date: 12/31/2019 Status: Attested    : Dawn Mcgraw LADC (Licensed Alcohol and Drug Counselor) Cosigner: Beth Milan Psy.D, [place]T at 1/8/2020  8:56 AM    Attestation signed by Beth Milan Psy.D, LP at 1/8/2020  8:56 AM    I have read, discussed and agree with the documentation provided by SOL Morton LADC.  Beth Milan PsyD, ABPP, LP                  Weekly Progress Note 12/30/19--1/3/20  CharmaineWilliamAgnes  1975  [id number]      D) Pt attended ZERO groups  this week with 1 excused absence. Patient reports that she has been consistently scheduled for work on Tuesdays which has prevented her from attending group, requested referral to a different program. A) Counselor contacted patient on 12/31/19 and indicated that the patient would need to be reassessed due to lack of contact with program staff. R) Unable to assess along six dimensions or for [facility] due to lack of attendance.   T) Patient has completed 96 of 120 (total hours) program hours at this time, patient to meet with counselor on 1/7/20 to be reassessed to determine appropriate referral. Current discharge plan is aftercare at another program, individual therapy and community supports. If patient does not present on 1/7/20, patient will be discharged AWOL.     SOL Morton LADC  12/31/2019, 12:02 PM       Weekly Educational Topics Date   1. Dual Diagnoses, week 1 8/26, 8/28, 8/30   2. Dual Diagnoses, week 2 12/6   3. Stress Management 8/12, 8/14 8/23; 11/11   4. Feelings/Emotions 9/9, 9/11, 9/13   5. Thinking 9/16, 9/18, 9/20   6. Change    7. Recovery Support 10/14, 10/18   8. Relationships/Communication 10/21   9. Addiction 101 9/30, 10/2, 10/4   10. Relapse Prevention 10/7, 10/11   11. Grief and Loss 7/22, 7/24,  7/26;   10/28, 10/30, 11/1   12. Strengths 7/31, 8/2; 11/8   13. Wellness 8/5, 8/7, 8/9; 11/18
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
## 2022-03-14 ENCOUNTER — TRANSFERRED RECORDS (OUTPATIENT)
Dept: HEALTH INFORMATION MANAGEMENT | Facility: CLINIC | Age: 47
End: 2022-03-14
Payer: COMMERCIAL

## 2022-04-21 PROCEDURE — 99284 EMERGENCY DEPT VISIT MOD MDM: CPT | Performed by: EMERGENCY MEDICINE

## 2022-04-21 PROCEDURE — 99282 EMERGENCY DEPT VISIT SF MDM: CPT | Performed by: EMERGENCY MEDICINE

## 2022-04-22 ENCOUNTER — HOSPITAL ENCOUNTER (EMERGENCY)
Facility: CLINIC | Age: 47
Discharge: HOME | End: 2022-04-22
Attending: EMERGENCY MEDICINE | Admitting: EMERGENCY MEDICINE
Payer: COMMERCIAL

## 2022-04-22 VITALS
HEART RATE: 81 BPM | BODY MASS INDEX: 21.29 KG/M2 | SYSTOLIC BLOOD PRESSURE: 146 MMHG | TEMPERATURE: 98.2 F | RESPIRATION RATE: 16 BRPM | WEIGHT: 140 LBS | DIASTOLIC BLOOD PRESSURE: 89 MMHG | OXYGEN SATURATION: 98 %

## 2022-04-22 DIAGNOSIS — F15.959 METHAMPHETAMINE-INDUCED PSYCHOTIC DISORDER (H): ICD-10-CM

## 2022-04-22 RX ORDER — PROPRANOLOL HYDROCHLORIDE 60 MG/1
TABLET ORAL
COMMUNITY

## 2022-04-22 RX ORDER — FLUOXETINE 40 MG/1
40 CAPSULE ORAL DAILY
COMMUNITY

## 2022-04-22 RX ORDER — HYDROXYZINE HYDROCHLORIDE 25 MG/1
25 TABLET, FILM COATED ORAL 3 TIMES DAILY PRN
COMMUNITY

## 2022-04-22 NOTE — ED NOTES
She said that she is here today because she has been using meth since the 7th. She was in a [place] program and she left there on the 7th to go use. She said that today she would hear her BF talking to her and he would tell her that he was not talking. It is only his voice that she is hearing. She is calm and cooperative. Sitting in the oneil. She also reported that she has not slept in 5 days. She denies any HI or SI

## 2022-04-22 NOTE — ED TRIAGE NOTES
"Auditory hallucination.  Episodes of paranoia, believes \"boyfriend is lying\" to her, saying words to her when he is not.    "

## 2022-04-22 NOTE — ED PROVIDER NOTES
ED Provider Note  [facility]      History     Chief Complaint   Patient presents with     Psychiatric Evaluation     Auditory hallucinations.     HPI  Agnes Montano is a 46 year old female who presents to the ED today with concerns for hallucinations after meth use.  She states she left her substance abuse treatment program the day before Easter, has been using methamphetamine since then.  She states she primarily smokes it.  She states she has been using daily, last use an hour prior to arrival.  She states that today she believes that she was hearing things that were not being said.  She states she accused her boyfriend of saying something that he said he did not say.  She states she has never had hallucinations in the past.  She states she has not slept in several days related to the meth use.  She denies any suicidal or homicidal ideations.  She states that she wants to stop using meth, and plans to call the chemical health assessment folks to update her we will 25/get back into treatment.  She states she knows the steps she needs to take.  She denies any acute physical complaints today.  She states that her family asked her to come in so she did.    Past Medical History  History reviewed. No pertinent past medical history.  Past Surgical History:   Procedure Laterality Date     ABDOMEN SURGERY       TONSILLECTOMY       ferrous sulfate 325 (65 FE) MG tablet  FLUoxetine (PROZAC) 40 MG capsule  hydrOXYzine (ATARAX) 25 MG tablet  propranolol (INDERAL) 60 MG tablet  cholecalciferol, vitamin D3, 1,000 unit tablet  gabapentin (NEURONTIN) 300 MG capsule  pramipexole (MIRAPEX) 0.25 MG tablet  traZODone (DESYREL) 50 MG tablet      No Known Allergies  Family History  History reviewed. No pertinent family history.  Social History   Social History     Tobacco Use     Smoking status: Never Smoker     Smokeless tobacco: Never Used   Substance Use Topics     Alcohol use: Yes     Alcohol/week: 2.0  - 4.0 standard drinks     Comment: Alcoholic Drinks/day: couple drinks a week     Drug use: Yes     Types: Methamphetamines     Comment: last use 1 hour ago      Past medical history, past surgical history, medications, allergies, family history, and social history were reviewed with the patient. No additional pertinent items.       Review of Systems  A complete review of systems was performed with pertinent positives and negatives noted in the HPI, and all other systems negative.    Physical Exam   Pulse: 85  Temp: 97.9  F (36.6  C)  Resp: 18  Weight: 63.5 kg (140 lb)  SpO2: 100 %  Physical Exam  Constitutional:       General: She is not in acute distress.     Appearance: She is not diaphoretic.   HENT:      Head: Atraumatic.   Eyes:      General: No scleral icterus.  Cardiovascular:      Heart sounds: Normal heart sounds.   Pulmonary:      Effort: No respiratory distress.      Breath sounds: Normal breath sounds.   Abdominal:      Palpations: Abdomen is soft.      Tenderness: There is no abdominal tenderness.   Musculoskeletal:         General: No deformity.   Skin:     General: Skin is warm.         ED Course      Procedures                     No results found for any visits on 04/22/22.  Medications - No data to display     Assessments & Plan (with Medical Decision Making)   The patient likely does have auditory hallucinations in relation to her meth use and her lack of sleep for multiple days.  She appears clinically sober at this time.  She has been here a number of hours.  She does not feel she is continuing to have hallucinations at this time.  I did discuss with her that really the solution for this problem is to abstain from substances.  She states that she knows what she needs to do to get back into treatment, plans to do that.  Encouraged her to do that today.  She does not need hospitalization.  She does not need medically supervised detox.  I did offer to let her rest until morning but she states she  would rather be discharged home.  She is encouraged to follow-up as discussed.  Encouraged to return if she has worsening or concerns.    Dictation Disclaimer: Some of this Note has been completed with voice-recognition dictation software. Although errors are generally corrected real-time, there is the potential for a rare error to be present in the completed chart.      I have reviewed the nursing notes. I have reviewed the findings, diagnosis, plan and need for follow up with the patient.    New Prescriptions    No medications on file       Final diagnoses:   Methamphetamine-induced psychotic disorder (H)       --  Yoana Iniguez  [facility] EMERGENCY DEPARTMENT  4/21/2022     Yoana Iniguez MD  04/22/22 0519

## 2022-04-22 NOTE — DISCHARGE INSTRUCTIONS
Follow up with your chemical health assessment as soon as possible. Return if you have any suicidal or homicidal thoughts, or any other concerns.